# Patient Record
(demographics unavailable — no encounter records)

---

## 2025-05-27 NOTE — HISTORY OF PRESENT ILLNESS
[FreeTextEntry1] : 19 YO P-0 LMP-   5- Is here for initial visit  dr Escobar patient  , discuss different birth control  pills. Patient reports very heavy and painful menses even on birth control.  She would like to discuss alternate method of contraception Medical history obesity ADHD on Concerta first over 6 years [TextBox_4] : GYNHX No history of fibroids, cysts, or STDs

## 2025-05-27 NOTE — DISCUSSION/SUMMARY
[FreeTextEntry1] : 18-year-old para 0 dysmenorrhea, menorrhagia Changed birth control to Seasonale OCP RTO as needed Risk of birth control including but not limited to heart attack stroke and DVT discussed with patient